# Patient Record
Sex: MALE | Race: BLACK OR AFRICAN AMERICAN | NOT HISPANIC OR LATINO
[De-identification: names, ages, dates, MRNs, and addresses within clinical notes are randomized per-mention and may not be internally consistent; named-entity substitution may affect disease eponyms.]

---

## 2017-02-02 ENCOUNTER — APPOINTMENT (OUTPATIENT)
Dept: HEMATOLOGY ONCOLOGY | Facility: CLINIC | Age: 52
End: 2017-02-02

## 2017-02-02 VITALS
DIASTOLIC BLOOD PRESSURE: 90 MMHG | WEIGHT: 298 LBS | HEIGHT: 71 IN | SYSTOLIC BLOOD PRESSURE: 130 MMHG | HEART RATE: 89 BPM | RESPIRATION RATE: 14 BRPM | TEMPERATURE: 98.4 F | BODY MASS INDEX: 41.72 KG/M2

## 2017-02-03 LAB
BASOPHILS # BLD: 0.04 TH/MM3
BASOPHILS NFR BLD: 0.5 %
EOSINOPHIL # BLD: 0.18 TH/MM3
EOSINOPHIL NFR BLD: 2.2 %
ERYTHROCYTE [DISTWIDTH] IN BLOOD BY AUTOMATED COUNT: 14.8 %
GRANULOCYTES # BLD: 6.43 TH/MM3
GRANULOCYTES NFR BLD: 77.1 %
HCT VFR BLD AUTO: 39.6 %
HGB BLD-MCNC: 14.3 G/DL
IMM GRANULOCYTES # BLD: 0.02 TH/MM3
IMM GRANULOCYTES NFR BLD: 0.2 %
LYMPHOCYTES # BLD: 1.18 TH/MM3
LYMPHOCYTES NFR BLD: 14.2 %
MCH RBC QN AUTO: 30.6 PG
MCHC RBC AUTO-ENTMCNC: 36.1 G/DL
MCV RBC AUTO: 84.8 FL
MONOCYTES # BLD: 0.48 TH/MM3
MONOCYTES NFR BLD: 5.8 %
PLATELET # BLD: 419 TH/MM3
PMV BLD AUTO: 11.1 FL
RBC # BLD AUTO: 4.67 MIL/MM3
WBC # BLD: 8.33 TH/MM3

## 2017-02-22 ENCOUNTER — RX RENEWAL (OUTPATIENT)
Age: 52
End: 2017-02-22

## 2017-02-24 ENCOUNTER — RX RENEWAL (OUTPATIENT)
Age: 52
End: 2017-02-24

## 2017-05-04 ENCOUNTER — APPOINTMENT (OUTPATIENT)
Dept: HEMATOLOGY ONCOLOGY | Facility: CLINIC | Age: 52
End: 2017-05-04

## 2017-05-04 VITALS
RESPIRATION RATE: 14 BRPM | HEART RATE: 76 BPM | DIASTOLIC BLOOD PRESSURE: 89 MMHG | BODY MASS INDEX: 40.04 KG/M2 | SYSTOLIC BLOOD PRESSURE: 131 MMHG | TEMPERATURE: 98 F | HEIGHT: 71 IN | WEIGHT: 286 LBS

## 2017-05-05 LAB
BASOPHILS # BLD: 0.05 TH/MM3
BASOPHILS NFR BLD: 0.6 %
EOSINOPHIL # BLD: 0.24 TH/MM3
EOSINOPHIL NFR BLD: 3.1 %
ERYTHROCYTE [DISTWIDTH] IN BLOOD BY AUTOMATED COUNT: 15.4 %
GRANULOCYTES # BLD: 5.86 TH/MM3
GRANULOCYTES NFR BLD: 75.9 %
HCT VFR BLD AUTO: 41.2 %
HGB BLD-MCNC: 14.5 G/DL
IMM GRANULOCYTES # BLD: 0.02 TH/MM3
IMM GRANULOCYTES NFR BLD: 0.3 %
LYMPHOCYTES # BLD: 1.17 TH/MM3
LYMPHOCYTES NFR BLD: 15.1 %
MCH RBC QN AUTO: 30.5 PG
MCHC RBC AUTO-ENTMCNC: 35.2 G/DL
MCV RBC AUTO: 86.7 FL
MONOCYTES # BLD: 0.39 TH/MM3
MONOCYTES NFR BLD: 5 %
PLATELET # BLD: 407 TH/MM3
PMV BLD AUTO: 11.6 FL
RBC # BLD AUTO: 4.75 MIL/MM3
WBC # BLD: 7.73 TH/MM3

## 2017-06-15 ENCOUNTER — RX RENEWAL (OUTPATIENT)
Age: 52
End: 2017-06-15

## 2017-06-15 RX ORDER — PEGINTERFERON ALFA-2A 180 UG/ML
180 INJECTION, SOLUTION SUBCUTANEOUS
Qty: 4 | Refills: 2 | Status: ACTIVE | COMMUNITY
Start: 2017-02-24 | End: 1900-01-01

## 2017-08-10 ENCOUNTER — OUTPATIENT (OUTPATIENT)
Dept: OUTPATIENT SERVICES | Facility: HOSPITAL | Age: 52
LOS: 1 days | Discharge: HOME | End: 2017-08-10

## 2017-08-10 ENCOUNTER — APPOINTMENT (OUTPATIENT)
Dept: HEMATOLOGY ONCOLOGY | Facility: CLINIC | Age: 52
End: 2017-08-10

## 2017-08-10 VITALS
RESPIRATION RATE: 14 BRPM | DIASTOLIC BLOOD PRESSURE: 95 MMHG | TEMPERATURE: 97.2 F | BODY MASS INDEX: 39.2 KG/M2 | HEIGHT: 71 IN | SYSTOLIC BLOOD PRESSURE: 148 MMHG | WEIGHT: 280 LBS | HEART RATE: 70 BPM

## 2017-08-10 DIAGNOSIS — I10 ESSENTIAL (PRIMARY) HYPERTENSION: ICD-10-CM

## 2017-08-10 DIAGNOSIS — D47.3 ESSENTIAL (HEMORRHAGIC) THROMBOCYTHEMIA: ICD-10-CM

## 2017-08-11 LAB
ALBUMIN SERPL-MCNC: 4.5 G/DL
ALBUMIN/GLOB SERPL: 1.67
ALP SERPL-CCNC: 53 IU/L
ALT SERPL-CCNC: 64 IU/L
ANION GAP SERPL CALC-SCNC: 9 MEQ/L
AST SERPL-CCNC: 44 IU/L
BASOPHILS # BLD: 0.04 TH/MM3
BASOPHILS NFR BLD: 0.5 %
BILIRUB SERPL-MCNC: 1.1 MG/DL
BUN SERPL-MCNC: 12 MG/DL
BUN/CREAT SERPL: 11.8 %
CALCIUM SERPL-MCNC: 9.7 MG/DL
CHLORIDE SERPL-SCNC: 98 MEQ/L
CO2 SERPL-SCNC: 29 MEQ/L
CREAT SERPL-MCNC: 1.02 MG/DL
EOSINOPHIL # BLD: 0.12 TH/MM3
EOSINOPHIL NFR BLD: 1.6 %
ERYTHROCYTE [DISTWIDTH] IN BLOOD BY AUTOMATED COUNT: 14.8 %
GFR SERPL CREATININE-BSD FRML MDRD: 93
GLUCOSE SERPL-MCNC: 117 MG/DL
GRANULOCYTES # BLD: 5.8 TH/MM3
GRANULOCYTES NFR BLD: 79.4 %
HCT VFR BLD AUTO: 40.2 %
HGB BLD-MCNC: 14.1 G/DL
IMM GRANULOCYTES # BLD: 0.02 TH/MM3
IMM GRANULOCYTES NFR BLD: 0.3 %
LYMPHOCYTES # BLD: 0.9 TH/MM3
LYMPHOCYTES NFR BLD: 12.3 %
MCH RBC QN AUTO: 30.8 PG
MCHC RBC AUTO-ENTMCNC: 35.1 G/DL
MCV RBC AUTO: 87.8 FL
MONOCYTES # BLD: 0.43 TH/MM3
MONOCYTES NFR BLD: 5.9 %
PLATELET # BLD: 395 TH/MM3
PMV BLD AUTO: 10.6 FL
POTASSIUM SERPL-SCNC: 4.3 MMOL/L
PROT SERPL-MCNC: 7.2 G/DL
RBC # BLD AUTO: 4.58 MIL/MM3
SODIUM SERPL-SCNC: 136 MEQ/L
WBC # BLD: 7.31 TH/MM3

## 2017-08-14 LAB — TSH SERPL DL<=0.005 MIU/L-ACNC: 2.78 UIU/ML

## 2017-09-12 ENCOUNTER — OTHER (OUTPATIENT)
Age: 52
End: 2017-09-12

## 2017-09-12 RX ORDER — ANAGRELIDE 0.5 MG/1
0.5 CAPSULE ORAL TWICE DAILY
Qty: 60 | Refills: 1 | Status: ACTIVE | COMMUNITY
Start: 2017-09-12 | End: 1900-01-01

## 2017-09-14 ENCOUNTER — RX RENEWAL (OUTPATIENT)
Age: 52
End: 2017-09-14

## 2017-09-30 ENCOUNTER — OUTPATIENT (OUTPATIENT)
Dept: OUTPATIENT SERVICES | Facility: HOSPITAL | Age: 52
LOS: 1 days | Discharge: HOME | End: 2017-09-30

## 2017-09-30 DIAGNOSIS — E11.9 TYPE 2 DIABETES MELLITUS WITHOUT COMPLICATIONS: ICD-10-CM

## 2017-09-30 DIAGNOSIS — E78.5 HYPERLIPIDEMIA, UNSPECIFIED: ICD-10-CM

## 2017-11-06 ENCOUNTER — APPOINTMENT (OUTPATIENT)
Dept: HEMATOLOGY ONCOLOGY | Facility: CLINIC | Age: 52
End: 2017-11-06

## 2018-03-15 ENCOUNTER — RX RENEWAL (OUTPATIENT)
Age: 53
End: 2018-03-15

## 2018-03-15 RX ORDER — ANAGRELIDE 0.5 MG/1
0.5 CAPSULE ORAL TWICE DAILY
Qty: 30 | Refills: 0 | Status: ACTIVE | COMMUNITY
Start: 2017-08-11 | End: 1900-01-01

## 2018-03-19 ENCOUNTER — LABORATORY RESULT (OUTPATIENT)
Age: 53
End: 2018-03-19

## 2018-03-19 ENCOUNTER — APPOINTMENT (OUTPATIENT)
Dept: HEMATOLOGY ONCOLOGY | Facility: CLINIC | Age: 53
End: 2018-03-19

## 2018-03-19 VITALS
TEMPERATURE: 97.1 F | BODY MASS INDEX: 38.64 KG/M2 | SYSTOLIC BLOOD PRESSURE: 163 MMHG | DIASTOLIC BLOOD PRESSURE: 89 MMHG | HEART RATE: 86 BPM | WEIGHT: 276 LBS | HEIGHT: 71 IN | RESPIRATION RATE: 14 BRPM

## 2018-03-19 LAB
HCT VFR BLD CALC: 43.9 %
HGB BLD-MCNC: 15.4 G/DL
MCHC RBC-ENTMCNC: 30.4 PG
MCHC RBC-ENTMCNC: 35.1 G/DL
MCV RBC AUTO: 86.6 FL
PLATELET # BLD AUTO: 486 K/UL
PMV BLD: 11.4 FL
RBC # BLD: 5.07 M/UL
RBC # FLD: 13.8 %
WBC # FLD AUTO: 10.7 K/UL

## 2018-03-19 RX ORDER — HYDROCHLOROTHIAZIDE 50 MG/1
50 TABLET ORAL
Refills: 0 | Status: ACTIVE | COMMUNITY

## 2018-03-19 RX ORDER — METOPROLOL TARTRATE 25 MG/1
25 TABLET, FILM COATED ORAL
Refills: 0 | Status: ACTIVE | COMMUNITY

## 2018-03-19 RX ORDER — NIFEDIPINE 10 MG/1
10 CAPSULE ORAL
Refills: 0 | Status: ACTIVE | COMMUNITY

## 2018-03-19 RX ORDER — CLOPIDOGREL BISULFATE 300 MG/1
TABLET, FILM COATED ORAL
Refills: 0 | Status: ACTIVE | COMMUNITY

## 2018-03-19 RX ORDER — METFORMIN HYDROCHLORIDE 500 MG/1
500 TABLET, COATED ORAL
Refills: 0 | Status: ACTIVE | COMMUNITY

## 2018-03-19 RX ORDER — PEGINTERFERON ALFA-2A 180 UG/.5ML
180 INJECTION, SOLUTION SUBCUTANEOUS
Refills: 0 | Status: DISCONTINUED | COMMUNITY

## 2018-03-19 RX ORDER — FAMOTIDINE 20 MG/1
20 TABLET, FILM COATED ORAL
Refills: 0 | Status: DISCONTINUED | COMMUNITY

## 2018-03-19 RX ORDER — QUINAPRIL HYDROCHLORIDE 10 MG/1
10 TABLET, FILM COATED ORAL
Refills: 0 | Status: ACTIVE | COMMUNITY

## 2018-03-20 LAB
ALBUMIN SERPL ELPH-MCNC: 4.9 G/DL
ALP BLD-CCNC: 71 U/L
ALT SERPL-CCNC: 45 U/L
ANION GAP SERPL CALC-SCNC: 18 MMOL/L
AST SERPL-CCNC: 24 U/L
BILIRUB SERPL-MCNC: 1 MG/DL
BUN SERPL-MCNC: 17 MG/DL
CALCIUM SERPL-MCNC: 10.1 MG/DL
CHLORIDE SERPL-SCNC: 92 MMOL/L
CO2 SERPL-SCNC: 27 MMOL/L
CREAT SERPL-MCNC: 1.3 MG/DL
GLUCOSE SERPL-MCNC: 372 MG/DL
POTASSIUM SERPL-SCNC: 4.3 MMOL/L
PROT SERPL-MCNC: 7.9 G/DL
SODIUM SERPL-SCNC: 137 MMOL/L

## 2018-05-21 ENCOUNTER — APPOINTMENT (OUTPATIENT)
Dept: HEMATOLOGY ONCOLOGY | Facility: CLINIC | Age: 53
End: 2018-05-21

## 2018-05-29 ENCOUNTER — OTHER (OUTPATIENT)
Age: 53
End: 2018-05-29

## 2018-05-29 ENCOUNTER — APPOINTMENT (OUTPATIENT)
Dept: HEMATOLOGY ONCOLOGY | Facility: CLINIC | Age: 53
End: 2018-05-29

## 2018-05-29 ENCOUNTER — LABORATORY RESULT (OUTPATIENT)
Age: 53
End: 2018-05-29

## 2018-05-29 ENCOUNTER — RX RENEWAL (OUTPATIENT)
Age: 53
End: 2018-05-29

## 2018-05-29 ENCOUNTER — OUTPATIENT (OUTPATIENT)
Dept: OUTPATIENT SERVICES | Facility: HOSPITAL | Age: 53
LOS: 1 days | Discharge: HOME | End: 2018-05-29

## 2018-05-29 DIAGNOSIS — D47.3 ESSENTIAL (HEMORRHAGIC) THROMBOCYTHEMIA: ICD-10-CM

## 2018-05-29 LAB
HCT VFR BLD CALC: 41.6 %
HGB BLD-MCNC: 15.1 G/DL
MCHC RBC-ENTMCNC: 30.6 PG
MCHC RBC-ENTMCNC: 36.3 G/DL
MCV RBC AUTO: 84.4 FL
PLATELET # BLD AUTO: 546 K/UL
PMV BLD: 12.2 FL
RBC # BLD: 4.93 M/UL
RBC # FLD: 13.4 %
WBC # FLD AUTO: 11.75 K/UL

## 2018-08-23 ENCOUNTER — RX RENEWAL (OUTPATIENT)
Age: 53
End: 2018-08-23

## 2018-10-11 ENCOUNTER — RX RENEWAL (OUTPATIENT)
Age: 53
End: 2018-10-11

## 2018-12-24 ENCOUNTER — APPOINTMENT (OUTPATIENT)
Dept: HEMATOLOGY ONCOLOGY | Facility: CLINIC | Age: 53
End: 2018-12-24

## 2018-12-24 ENCOUNTER — OUTPATIENT (OUTPATIENT)
Dept: OUTPATIENT SERVICES | Facility: HOSPITAL | Age: 53
LOS: 1 days | Discharge: HOME | End: 2018-12-24

## 2018-12-24 ENCOUNTER — LABORATORY RESULT (OUTPATIENT)
Age: 53
End: 2018-12-24

## 2018-12-24 VITALS
HEIGHT: 71 IN | BODY MASS INDEX: 40.18 KG/M2 | DIASTOLIC BLOOD PRESSURE: 81 MMHG | RESPIRATION RATE: 14 BRPM | WEIGHT: 287 LBS | SYSTOLIC BLOOD PRESSURE: 124 MMHG | TEMPERATURE: 96.5 F | HEART RATE: 95 BPM

## 2018-12-24 LAB
HCT VFR BLD CALC: 37.9 %
HGB BLD-MCNC: 13.3 G/DL
MCHC RBC-ENTMCNC: 29.8 PG
MCHC RBC-ENTMCNC: 35.1 G/DL
MCV RBC AUTO: 85 FL
PLATELET # BLD AUTO: 344 K/UL
PMV BLD: 12.2 FL
RBC # BLD: 4.46 M/UL
RBC # FLD: 16.5 %
WBC # FLD AUTO: 9.16 K/UL

## 2018-12-24 NOTE — PHYSICAL EXAM
[Obese] : obese [Fully active, able to carry on all pre-disease performance without restriction] : Status 0 - Fully active, able to carry on all pre-disease performance without restriction [Normal] : full range of motion and no deformities appreciated

## 2018-12-26 DIAGNOSIS — D47.3 ESSENTIAL (HEMORRHAGIC) THROMBOCYTHEMIA: ICD-10-CM

## 2018-12-26 LAB
ALBUMIN SERPL ELPH-MCNC: 4.9 G/DL
ALP BLD-CCNC: 58 U/L
ALT SERPL-CCNC: 39 U/L
ANION GAP SERPL CALC-SCNC: 21 MMOL/L
AST SERPL-CCNC: 29 U/L
BILIRUB SERPL-MCNC: 0.9 MG/DL
BUN SERPL-MCNC: 15 MG/DL
CALCIUM SERPL-MCNC: 9.7 MG/DL
CHLORIDE SERPL-SCNC: 92 MMOL/L
CO2 SERPL-SCNC: 23 MMOL/L
CREAT SERPL-MCNC: 1.3 MG/DL
GLUCOSE SERPL-MCNC: 290 MG/DL
POTASSIUM SERPL-SCNC: 3.5 MMOL/L
PROT SERPL-MCNC: 7.5 G/DL
SODIUM SERPL-SCNC: 136 MMOL/L
TSH SERPL-ACNC: 1.4 UIU/ML

## 2018-12-26 NOTE — HISTORY OF PRESENT ILLNESS
[de-identified] : 52 year old male with TAMEKA 2 positive essential thrombocythemia on pegasys 180 mcg weekly , tolerated well . PMH of hypertension and CAD on plavix and ASA .  [de-identified] : He returns for follow-up , no new complaints . \par He is tolerating Anagrelide with no new symptoms. takes 0.5 mg twice daily.\par No Rash, chest pain, abdominal pain or SOB, Palpitations.\par Review of system is negative.\par \par 12/24/18\par Patient here for follow up\par So for 2 months, has been having lose watery stool, associated with high fiber foods at times, other times there is no association \par Occasional palpitations\par No headache, blurry vision, chest pain, shortness of breath, dizziness

## 2018-12-26 NOTE — ASSESSMENT
[FreeTextEntry1] : 54yo M with TAMEKA 2 Driven Essential thrombocythemia with history of MI: was previously  well controlled on interferon but had to stop due to insurance issues, has been on anagrelide \par \par PLAN:\par JAK2 positive ET:\par - Current platelet count 344, with normal WBC, and mild decrease in Hgb to 13.3\par - Continue with Anagrelide 1mg BID, refill requested for [facility] \par \par \par

## 2019-03-04 ENCOUNTER — APPOINTMENT (OUTPATIENT)
Dept: HEMATOLOGY ONCOLOGY | Facility: CLINIC | Age: 54
End: 2019-03-04

## 2019-03-25 ENCOUNTER — APPOINTMENT (OUTPATIENT)
Dept: HEMATOLOGY ONCOLOGY | Facility: CLINIC | Age: 54
End: 2019-03-25

## 2019-03-25 ENCOUNTER — LABORATORY RESULT (OUTPATIENT)
Age: 54
End: 2019-03-25

## 2019-03-25 ENCOUNTER — OUTPATIENT (OUTPATIENT)
Dept: OUTPATIENT SERVICES | Facility: HOSPITAL | Age: 54
LOS: 1 days | Discharge: HOME | End: 2019-03-25

## 2019-03-25 VITALS
BODY MASS INDEX: 38.5 KG/M2 | HEIGHT: 71 IN | WEIGHT: 275 LBS | DIASTOLIC BLOOD PRESSURE: 84 MMHG | SYSTOLIC BLOOD PRESSURE: 140 MMHG | HEART RATE: 89 BPM | TEMPERATURE: 96.9 F | RESPIRATION RATE: 14 BRPM

## 2019-03-25 LAB
HCT VFR BLD CALC: 41 %
HGB BLD-MCNC: 14.1 G/DL
MCHC RBC-ENTMCNC: 28.8 PG
MCHC RBC-ENTMCNC: 34.4 G/DL
MCV RBC AUTO: 83.7 FL
PLATELET # BLD AUTO: 415 K/UL
PMV BLD: 11.9 FL
RBC # BLD: 4.9 M/UL
RBC # FLD: 16.9 %
WBC # FLD AUTO: 10.48 K/UL

## 2019-03-25 NOTE — PHYSICAL EXAM
[Fully active, able to carry on all pre-disease performance without restriction] : Status 0 - Fully active, able to carry on all pre-disease performance without restriction [Obese] : obese [Normal] : full range of motion and no deformities appreciated

## 2019-03-26 LAB
ALBUMIN SERPL ELPH-MCNC: 4.8 G/DL
ALP BLD-CCNC: 86 U/L
ALT SERPL-CCNC: 55 U/L
ANION GAP SERPL CALC-SCNC: 17 MMOL/L
AST SERPL-CCNC: 32 U/L
BILIRUB SERPL-MCNC: 0.9 MG/DL
BUN SERPL-MCNC: 20 MG/DL
CALCIUM SERPL-MCNC: 10.5 MG/DL
CHLORIDE SERPL-SCNC: 98 MMOL/L
CO2 SERPL-SCNC: 26 MMOL/L
CREAT SERPL-MCNC: 1.3 MG/DL
FERRITIN SERPL-MCNC: 172 NG/ML
GLUCOSE SERPL-MCNC: 187 MG/DL
IRON SATN MFR SERPL: 32 %
IRON SERPL-MCNC: 96 UG/DL
POTASSIUM SERPL-SCNC: 3.5 MMOL/L
PROT SERPL-MCNC: 7.4 G/DL
SODIUM SERPL-SCNC: 141 MMOL/L
TIBC SERPL-MCNC: 302 UG/DL
UIBC SERPL-MCNC: 206 UG/DL

## 2019-03-26 NOTE — ASSESSMENT
[FreeTextEntry1] : 52yo M with TAMEKA 2 Driven Essential thrombocythemia with history of MI: was previously  well controlled on interferon but had to stop due to insurance issues, has been on anagrelide \par \par PLAN:\par JAK2 positive ET:\par - CBC today showed Plts 415 with normal wbc and Hgb. \par - Continue with Anagrelide 1mg BID, refill requested for [facility].\par - Continue ASA 81mg. \par - Labs ordered. \par \par RTC in 4 months.\par Pt seen and examined with \par \par

## 2019-03-28 DIAGNOSIS — D47.3 ESSENTIAL (HEMORRHAGIC) THROMBOCYTHEMIA: ICD-10-CM

## 2019-07-22 ENCOUNTER — OUTPATIENT (OUTPATIENT)
Dept: OUTPATIENT SERVICES | Facility: HOSPITAL | Age: 54
LOS: 1 days | Discharge: HOME | End: 2019-07-22

## 2019-07-22 ENCOUNTER — APPOINTMENT (OUTPATIENT)
Dept: HEMATOLOGY ONCOLOGY | Facility: CLINIC | Age: 54
End: 2019-07-22
Payer: COMMERCIAL

## 2019-07-22 ENCOUNTER — LABORATORY RESULT (OUTPATIENT)
Age: 54
End: 2019-07-22

## 2019-07-22 VITALS
HEIGHT: 71 IN | SYSTOLIC BLOOD PRESSURE: 126 MMHG | HEART RATE: 88 BPM | WEIGHT: 272 LBS | RESPIRATION RATE: 14 BRPM | BODY MASS INDEX: 38.08 KG/M2 | DIASTOLIC BLOOD PRESSURE: 66 MMHG | TEMPERATURE: 96.5 F

## 2019-07-22 LAB
HCT VFR BLD CALC: 42.4 %
HGB BLD-MCNC: 14.7 G/DL
MCHC RBC-ENTMCNC: 29.1 PG
MCHC RBC-ENTMCNC: 34.7 G/DL
MCV RBC AUTO: 83.8 FL
PLATELET # BLD AUTO: 510 K/UL
PMV BLD: 12.9 FL
RBC # BLD: 5.06 M/UL
RBC # FLD: 19 %
WBC # FLD AUTO: 10.43 K/UL

## 2019-07-22 PROCEDURE — 99213 OFFICE O/P EST LOW 20 MIN: CPT

## 2019-07-23 NOTE — ASSESSMENT
[FreeTextEntry1] : 52yo M with TAMEKA 2 Driven Essential thrombocythemia with history of MI: was previously  well controlled on interferon but had to stop due to insurance issues, has been on anagrelide \par \par PLAN:\par JAK2 positive ET:\par - CBC today showed some rise in plts from 415 to 510. However, he does mention that he missed almost a week of anegrelide as the medication was not delivered. This happened approximately about a week ago.Thus, we will continue with same dose of Anagrelide 1mg BID\par - Continue ASA 81mg. \par \par RTC in 3 months.\par Pt seen and examined with \par \par

## 2019-07-23 NOTE — HISTORY OF PRESENT ILLNESS
[de-identified] : 52 year old male with TAMEKA 2 positive essential thrombocythemia on pegasys 180 mcg weekly , tolerated well . PMH of hypertension and CAD on plavix and ASA .  [de-identified] : He returns for follow-up , no new complaints . \par He is tolerating Anagrelide with no new symptoms. takes 0.5 mg twice daily.\par No Rash, chest pain, abdominal pain or SOB, Palpitations.\par Review of system is negative.\par \par 12/24/18\par Patient here for follow up\par So for 2 months, has been having lose watery stool, associated with high fiber foods at times, other times there is no association \par Occasional palpitations\par No headache, blurry vision, chest pain, shortness of breath, dizziness \par \par 3/25/19:\par Doing well. No major complaints.\par Denies fever, nausea, vomiting, chest pain, SOB, abdominal pain, bowel and bladder problems.\par On Anagrelide around 1 year.\par On ASA 81mg.\par No bleeding. \par \par 7/22/19: Pt returns for a f/u visit. He has no fresh complaints. His CBC today showed some rise in plts from 415 to 510. However, he does mention that he missed almost a week of anegrelide as the medication was not delivered. This happened approximately about a week ago.

## 2019-07-23 NOTE — REASON FOR VISIT
[Follow-Up Visit] : a follow-up visit for [Spouse] : spouse [FreeTextEntry2] : Essential thrombocythemia

## 2019-07-24 DIAGNOSIS — D47.3 ESSENTIAL (HEMORRHAGIC) THROMBOCYTHEMIA: ICD-10-CM

## 2019-11-04 ENCOUNTER — APPOINTMENT (OUTPATIENT)
Dept: HEMATOLOGY ONCOLOGY | Facility: CLINIC | Age: 54
End: 2019-11-04

## 2019-11-27 ENCOUNTER — RX RENEWAL (OUTPATIENT)
Age: 54
End: 2019-11-27

## 2019-11-30 NOTE — HISTORY OF PRESENT ILLNESS
Pt taken to exercise room with 2 staff, phone and ticket to ride. She biked for a mile and used the massage chair. Also stretched on the exercise ball.   [de-identified] : 52 year old male with TAMEKA 2 positive essential thrombocythemia on pegasys 180 mcg weekly , tolerated well . PMH of hypertension and CAD on plavix and ASA .  [de-identified] : He returns for follow-up , no new complaints . \par He is tolerating Anagrelide with no new symptoms. takes 0.5 mg twice daily.\par No Rash, chest pain, abdominal pain or SOB, Palpitations.\par Review of system is negative.\par \par 12/24/18\par Patient here for follow up\par So for 2 months, has been having lose watery stool, associated with high fiber foods at times, other times there is no association \par Occasional palpitations\par No headache, blurry vision, chest pain, shortness of breath, dizziness \par \par 3/25/19:\par Doing well. No major complaints.\par Denies fever, nausea, vomiting, chest pain, SOB, abdominal pain, bowel and bladder problems.\par On Anagrelide around 1 year.\par On ASA 81mg.\par No bleeding. \par

## 2019-12-03 ENCOUNTER — OUTPATIENT (OUTPATIENT)
Dept: OUTPATIENT SERVICES | Facility: HOSPITAL | Age: 54
LOS: 1 days | Discharge: HOME | End: 2019-12-03

## 2019-12-03 ENCOUNTER — APPOINTMENT (OUTPATIENT)
Dept: HEMATOLOGY ONCOLOGY | Facility: CLINIC | Age: 54
End: 2019-12-03
Payer: COMMERCIAL

## 2019-12-03 ENCOUNTER — LABORATORY RESULT (OUTPATIENT)
Age: 54
End: 2019-12-03

## 2019-12-03 VITALS
SYSTOLIC BLOOD PRESSURE: 129 MMHG | TEMPERATURE: 97.9 F | WEIGHT: 272 LBS | HEIGHT: 71 IN | HEART RATE: 88 BPM | RESPIRATION RATE: 16 BRPM | DIASTOLIC BLOOD PRESSURE: 78 MMHG | BODY MASS INDEX: 38.08 KG/M2

## 2019-12-03 PROCEDURE — 99213 OFFICE O/P EST LOW 20 MIN: CPT

## 2019-12-31 NOTE — HISTORY OF PRESENT ILLNESS
[de-identified] : 52 year old male with TAMEKA 2 positive essential thrombocythemia on pegasys 180 mcg weekly , tolerated well . PMH of hypertension and CAD on plavix and ASA .  [de-identified] : He returns for follow-up , no new complaints . \par He is tolerating Anagrelide with no new symptoms. takes 0.5 mg twice daily.\par No Rash, chest pain, abdominal pain or SOB, Palpitations.\par Review of system is negative.\par \par 12/24/18\par Patient here for follow up\par So for 2 months, has been having lose watery stool, associated with high fiber foods at times, other times there is no association \par Occasional palpitations\par No headache, blurry vision, chest pain, shortness of breath, dizziness \par \par 3/25/19:\par Doing well. No major complaints.\par Denies fever, nausea, vomiting, chest pain, SOB, abdominal pain, bowel and bladder problems.\par On Anagrelide around 1 year.\par On ASA 81mg.\par No bleeding. \par \par 7/22/19: Pt returns for a f/u visit. He has no fresh complaints. His CBC today showed some rise in plts from 415 to 510. However, he does mention that he missed almost a week of anegrelide as the medication was not delivered. This happened approximately about a week ago. \par \par 12/3/19: Pt returns for a f/u visit. No fresh complaints. Plts are 399 with Hb 13.8 and WBC 10.63. Tolerating anegrelide well.

## 2020-01-07 DIAGNOSIS — D47.3 ESSENTIAL (HEMORRHAGIC) THROMBOCYTHEMIA: ICD-10-CM

## 2020-01-22 ENCOUNTER — RX RENEWAL (OUTPATIENT)
Age: 55
End: 2020-01-22

## 2020-03-01 LAB
HCT VFR BLD CALC: 39.9 %
HGB BLD-MCNC: 13.8 G/DL
MCHC RBC-ENTMCNC: 29.4 PG
MCHC RBC-ENTMCNC: 34.6 G/DL
MCV RBC AUTO: 84.9 FL
PLATELET # BLD AUTO: 399 K/UL
PMV BLD: NORMAL
RBC # BLD: 4.7 M/UL
RBC # FLD: 19.4 %
WBC # FLD AUTO: 10.63 K/UL

## 2020-03-02 ENCOUNTER — APPOINTMENT (OUTPATIENT)
Dept: HEMATOLOGY ONCOLOGY | Facility: CLINIC | Age: 55
End: 2020-03-02

## 2020-04-16 ENCOUNTER — RX RENEWAL (OUTPATIENT)
Age: 55
End: 2020-04-16

## 2020-05-13 ENCOUNTER — APPOINTMENT (OUTPATIENT)
Dept: UROLOGY | Facility: CLINIC | Age: 55
End: 2020-05-13

## 2020-07-10 ENCOUNTER — RX RENEWAL (OUTPATIENT)
Age: 55
End: 2020-07-10

## 2020-09-08 ENCOUNTER — LABORATORY RESULT (OUTPATIENT)
Age: 55
End: 2020-09-08

## 2020-09-08 ENCOUNTER — APPOINTMENT (OUTPATIENT)
Dept: HEMATOLOGY ONCOLOGY | Facility: CLINIC | Age: 55
End: 2020-09-08
Payer: COMMERCIAL

## 2020-09-08 VITALS
DIASTOLIC BLOOD PRESSURE: 79 MMHG | BODY MASS INDEX: 37.8 KG/M2 | RESPIRATION RATE: 16 BRPM | WEIGHT: 270 LBS | HEIGHT: 71 IN | SYSTOLIC BLOOD PRESSURE: 130 MMHG | HEART RATE: 88 BPM | TEMPERATURE: 96.8 F

## 2020-09-08 LAB
HCT VFR BLD CALC: 39.2 %
HGB BLD-MCNC: 13.4 G/DL
MCHC RBC-ENTMCNC: 29.2 PG
MCHC RBC-ENTMCNC: 34.2 G/DL
MCV RBC AUTO: 85.4 FL
PLATELET # BLD AUTO: 297 K/UL
PMV BLD: NORMAL
RBC # BLD: 4.59 M/UL
RBC # FLD: 19.6 %
WBC # FLD AUTO: 11.64 K/UL

## 2020-09-08 PROCEDURE — 99214 OFFICE O/P EST MOD 30 MIN: CPT

## 2020-09-09 NOTE — HISTORY OF PRESENT ILLNESS
[de-identified] : He returns for follow-up , no new complaints . \par He is tolerating Anagrelide with no new symptoms. takes 0.5 mg twice daily.\par No Rash, chest pain, abdominal pain or SOB, Palpitations.\par Review of system is negative.\par \par 12/24/18\par Patient here for follow up\par So for 2 months, has been having lose watery stool, associated with high fiber foods at times, other times there is no association \par Occasional palpitations\par No headache, blurry vision, chest pain, shortness of breath, dizziness \par \par 3/25/19:\par Doing well. No major complaints.\par Denies fever, nausea, vomiting, chest pain, SOB, abdominal pain, bowel and bladder problems.\par On Anagrelide around 1 year.\par On ASA 81mg.\par No bleeding. \par \par 7/22/19: Pt returns for a f/u visit. He has no fresh complaints. His CBC today showed some rise in plts from 415 to 510. However, he does mention that he missed almost a week of anegrelide as the medication was not delivered. This happened approximately about a week ago. \par \par 12/3/19: Pt returns for a f/u visit. No fresh complaints. Plts are 399 with Hb 13.8 and WBC 10.63. Tolerating anegrelide well. \par \par 9/8/2020: The patient is here for follow up . He remains on anagrelide for 1,5 yrs. His only complaint is fatigue, no headache, no dizziness, no cardiac or respiratory problems. He sustained a mechanical fall few months back ,and sustained left ribs fracture , and left shoulder trauma [de-identified] : 52 year old male with TAMEKA 2 positive essential thrombocythemia on pegasys 180 mcg weekly , tolerated well . PMH of hypertension and CAD on plavix and ASA .

## 2020-09-09 NOTE — PHYSICAL EXAM
[Fully active, able to carry on all pre-disease performance without restriction] : Status 0 - Fully active, able to carry on all pre-disease performance without restriction [Obese] : obese [Normal] : no peripheral adenopathy appreciated [de-identified] : Left shoulder and left ribs tenderness

## 2020-09-09 NOTE — ASSESSMENT
[FreeTextEntry1] : 54yo M with TAMEKA 2 Driven Essential thrombocythemia with history of MI: was previously  well controlled on interferon but had to stop due to insurance issues, has been on anagrelide 1mg BID for 1.5 yrs\par \par PLAN:\par JAK2 positive ET:\par - CBC is reviewed . Counts are adequate\par - continue with same dose of Anagrelide 1mg BID\par - Continue ASA 81mg and plavix. \par - His fatigue could be secondary to anagrelide and secondary to his disease\par \par RTC in 4 months.\par Pt seen and examined with \par \par

## 2020-11-19 ENCOUNTER — OUTPATIENT (OUTPATIENT)
Dept: OUTPATIENT SERVICES | Facility: HOSPITAL | Age: 55
LOS: 1 days | Discharge: HOME | End: 2020-11-19
Payer: COMMERCIAL

## 2020-11-19 ENCOUNTER — LABORATORY RESULT (OUTPATIENT)
Age: 55
End: 2020-11-19

## 2020-11-19 ENCOUNTER — APPOINTMENT (OUTPATIENT)
Dept: HEMATOLOGY ONCOLOGY | Facility: CLINIC | Age: 55
End: 2020-11-19
Payer: COMMERCIAL

## 2020-11-19 VITALS
TEMPERATURE: 97.1 F | RESPIRATION RATE: 16 BRPM | WEIGHT: 275 LBS | SYSTOLIC BLOOD PRESSURE: 109 MMHG | DIASTOLIC BLOOD PRESSURE: 75 MMHG | HEART RATE: 99 BPM | HEIGHT: 71 IN | BODY MASS INDEX: 38.5 KG/M2

## 2020-11-19 DIAGNOSIS — D47.3 ESSENTIAL (HEMORRHAGIC) THROMBOCYTHEMIA: ICD-10-CM

## 2020-11-19 DIAGNOSIS — Z00.00 ENCOUNTER FOR GENERAL ADULT MEDICAL EXAMINATION W/OUT ABNORMAL FINDINGS: ICD-10-CM

## 2020-11-19 LAB
ALBUMIN SERPL ELPH-MCNC: 5.1 G/DL
ALP BLD-CCNC: 76 U/L
ALT SERPL-CCNC: 34 U/L
ANION GAP SERPL CALC-SCNC: 13 MMOL/L
AST SERPL-CCNC: 27 U/L
BILIRUB SERPL-MCNC: 1.3 MG/DL
BUN SERPL-MCNC: 26 MG/DL
CALCIUM SERPL-MCNC: 10.3 MG/DL
CHLORIDE SERPL-SCNC: 101 MMOL/L
CO2 SERPL-SCNC: 27 MMOL/L
CREAT SERPL-MCNC: 1.6 MG/DL
GLUCOSE SERPL-MCNC: 202 MG/DL
HCT VFR BLD CALC: 39.2 %
HGB BLD-MCNC: 13.6 G/DL
LDH SERPL-CCNC: 602 U/L
MCHC RBC-ENTMCNC: 29.8 PG
MCHC RBC-ENTMCNC: 34.7 G/DL
MCV RBC AUTO: 86 FL
PLATELET # BLD AUTO: 247 K/UL
PMV BLD: NORMAL
POTASSIUM SERPL-SCNC: 4 MMOL/L
PROT SERPL-MCNC: 7.7 G/DL
RBC # BLD: 4.56 M/UL
RBC # FLD: 19.2 %
SODIUM SERPL-SCNC: 141 MMOL/L
WBC # FLD AUTO: 11.86 K/UL

## 2020-11-19 PROCEDURE — 88189 FLOWCYTOMETRY/READ 16 & >: CPT

## 2020-11-19 PROCEDURE — 99214 OFFICE O/P EST MOD 30 MIN: CPT

## 2020-11-19 NOTE — PHYSICAL EXAM
[Obese] : obese [Normal] : normoactive bowel sounds, soft and nontender, no hepatosplenomegaly or masses appreciated

## 2020-11-19 NOTE — HISTORY OF PRESENT ILLNESS
[de-identified] : 52 year old male with TAMEKA 2 positive essential thrombocythemia on pegasys 180 mcg weekly , tolerated well . PMH of hypertension and CAD on plavix and ASA .  [de-identified] : He returns for follow-up , no new complaints . he is scheduled for oral surgery for wisdom tooth . \par \par 11/19/2020 Patient returns for follow up , he continues on anagrelide , recent blood work showed mild leukocytosis with increased immature wbc ( 5 % ? ) , Hb and platelets are stable . He continues on physical therapy for adhesive capsulitis of left shoulder ( currently on medical leave )

## 2020-11-19 NOTE — ASSESSMENT
[FreeTextEntry1] : Essential thrombocythemia , on anagrelide.\par Mild leukocytosis with left shift probably due to underlying MPN , ( no recent systemic or intraarticular steroids ) \par Plan : will review smear , check flow cytometry ,. consider bone marrow .

## 2020-11-24 ENCOUNTER — LABORATORY RESULT (OUTPATIENT)
Age: 55
End: 2020-11-24

## 2020-12-19 ENCOUNTER — RX RENEWAL (OUTPATIENT)
Age: 55
End: 2020-12-19

## 2020-12-19 RX ORDER — ANAGRELIDE 1 MG/1
1 CAPSULE ORAL
Qty: 180 | Refills: 3 | Status: ACTIVE | COMMUNITY
Start: 2017-09-12 | End: 1900-01-01

## 2021-01-11 ENCOUNTER — APPOINTMENT (OUTPATIENT)
Dept: HEMATOLOGY ONCOLOGY | Facility: CLINIC | Age: 56
End: 2021-01-11

## 2021-01-29 ENCOUNTER — LABORATORY RESULT (OUTPATIENT)
Age: 56
End: 2021-01-29

## 2021-01-29 ENCOUNTER — RESULT REVIEW (OUTPATIENT)
Age: 56
End: 2021-01-29

## 2021-01-29 ENCOUNTER — APPOINTMENT (OUTPATIENT)
Dept: HEMATOLOGY ONCOLOGY | Facility: CLINIC | Age: 56
End: 2021-01-29
Payer: COMMERCIAL

## 2021-01-29 VITALS
HEART RATE: 95 BPM | WEIGHT: 268 LBS | SYSTOLIC BLOOD PRESSURE: 133 MMHG | RESPIRATION RATE: 16 BRPM | DIASTOLIC BLOOD PRESSURE: 80 MMHG | HEIGHT: 71 IN | TEMPERATURE: 97.2 F | BODY MASS INDEX: 37.52 KG/M2

## 2021-01-29 PROCEDURE — 88313 SPECIAL STAINS GROUP 2: CPT | Mod: 26

## 2021-01-29 PROCEDURE — 38220 DX BONE MARROW ASPIRATIONS: CPT | Mod: 59

## 2021-01-29 PROCEDURE — 88342 IMHCHEM/IMCYTCHM 1ST ANTB: CPT | Mod: 26,59

## 2021-01-29 PROCEDURE — 88305 TISSUE EXAM BY PATHOLOGIST: CPT | Mod: 26

## 2021-01-29 PROCEDURE — 88311 DECALCIFY TISSUE: CPT | Mod: 26

## 2021-01-29 PROCEDURE — 38221 DX BONE MARROW BIOPSIES: CPT

## 2021-01-29 PROCEDURE — 88189 FLOWCYTOMETRY/READ 16 & >: CPT

## 2021-01-29 PROCEDURE — 88341 IMHCHEM/IMCYTCHM EA ADD ANTB: CPT | Mod: 26

## 2021-01-29 RX ORDER — ACETAMINOPHEN 500 MG
650 TABLET ORAL ONCE
Refills: 0 | Status: COMPLETED | OUTPATIENT
Start: 2021-01-29 | End: 2021-01-29

## 2021-01-29 RX ADMIN — Medication 650 MILLIGRAM(S): at 12:02

## 2021-02-01 ENCOUNTER — LABORATORY RESULT (OUTPATIENT)
Age: 56
End: 2021-02-01

## 2021-02-05 LAB — HEMATOPATHOLOGY REPORT: SIGNIFICANT CHANGE UP

## 2021-02-11 NOTE — PROCEDURE
[Bone Marrow Biopsy] : bone marrow biopsy [Bone Marrow Aspiration] : bone marrow aspiration  [Right lateral decubitus position] : right lateral decubitus position [Aspirate] : aspirate [FISH] : FISH [Cytogenetics] : cytogenetics [Biopsy] : biopsy [Flow Cytometry] : flow cytometry [] : The patient was instructed to remove the bandage the following AM. The patient may bathe. Acetaminophen may be taken for discomfort, as per package directions.If there are any other problems, the patient was instructed to call the office. The patient verbalized understanding, and is aware of the office contact numbers. [FreeTextEntry1] : MPN

## 2021-02-16 ENCOUNTER — LABORATORY RESULT (OUTPATIENT)
Age: 56
End: 2021-02-16

## 2021-02-16 ENCOUNTER — APPOINTMENT (OUTPATIENT)
Dept: HEMATOLOGY ONCOLOGY | Facility: CLINIC | Age: 56
End: 2021-02-16
Payer: COMMERCIAL

## 2021-02-16 VITALS
RESPIRATION RATE: 16 BRPM | SYSTOLIC BLOOD PRESSURE: 127 MMHG | WEIGHT: 268 LBS | HEIGHT: 71 IN | TEMPERATURE: 97.1 F | HEART RATE: 91 BPM | BODY MASS INDEX: 37.52 KG/M2 | DIASTOLIC BLOOD PRESSURE: 80 MMHG

## 2021-02-16 DIAGNOSIS — I25.10 ATHEROSCLEROTIC HEART DISEASE OF NATIVE CORONARY ARTERY W/OUT ANGINA PECTORIS: ICD-10-CM

## 2021-02-16 LAB
HCT VFR BLD CALC: 41.2 %
HGB BLD-MCNC: 14 G/DL
MCHC RBC-ENTMCNC: 29.6 PG
MCHC RBC-ENTMCNC: 34 G/DL
MCV RBC AUTO: 87.1 FL
PLATELET # BLD AUTO: 333 K/UL
PMV BLD: NORMAL
RBC # BLD: 4.73 M/UL
RBC # FLD: 19 %
WBC # FLD AUTO: 12.67 K/UL

## 2021-02-16 PROCEDURE — 99214 OFFICE O/P EST MOD 30 MIN: CPT

## 2021-02-16 NOTE — PHYSICAL EXAM
[Obese] : obese [Normal] : RRR, normal S1S2, no murmurs, rubs, gallops [de-identified] : obese with large ventral hernia , spleen could not be evaluated.

## 2021-02-16 NOTE — HISTORY OF PRESENT ILLNESS
[de-identified] : 52 year old male with TAMEKA 2 positive essential thrombocythemia on pegasys 180 mcg weekly , tolerated well . PMH of hypertension and CAD on plavix and ASA .  [de-identified] : He returns for follow-up , no new complaints . he is scheduled for oral surgery for wisdom tooth . \par \par 11/19/2020 Patient returns for follow up , he continues on anagrelide , recent blood work showed mild leukocytosis with increased immature wbc ( 5 % ? ) , Hb and platelets are stable . He continues on physical therapy for adhesive capsulitis of left shoulder ( currently on medical leave ) \par \par 02/16/2021 Patient returns after bone marrow biopsy which showed moderate fibrosis , no increased blasts by flow or IHC   . He offers no complaints . He denies abdominal pain , fever , sweats , fatigue or weight loss. No prior imaging of liver or spleen .

## 2021-02-16 NOTE — ASSESSMENT
[FreeTextEntry1] : 55 year old black male on interferon followed by anagrelide for suspected ET . \par Bone marrow shows moderate fibrosis , likely primary , less likely post- thrombocythemic . \par will check liver spleen sonogram , LDH , ferritin .\par The natural history , prognosis and treatment of myelofibrosis was discussed at great length , indications for treatment including stem cell transplant ( only curative therapy )\par Plan : consider to change to hydrea after ultrasound .

## 2021-02-17 LAB
ALBUMIN SERPL ELPH-MCNC: 4.9 G/DL
ALP BLD-CCNC: 80 U/L
ALT SERPL-CCNC: 32 U/L
ANION GAP SERPL CALC-SCNC: 14 MMOL/L
AST SERPL-CCNC: 28 U/L
BILIRUB SERPL-MCNC: 1.1 MG/DL
BUN SERPL-MCNC: 21 MG/DL
CALCIUM SERPL-MCNC: 10 MG/DL
CHLORIDE SERPL-SCNC: 101 MMOL/L
CO2 SERPL-SCNC: 25 MMOL/L
CREAT SERPL-MCNC: 1.4 MG/DL
FERRITIN SERPL-MCNC: 112 NG/ML
GLUCOSE SERPL-MCNC: 109 MG/DL
LDH SERPL-CCNC: 693 U/L
POTASSIUM SERPL-SCNC: 3.9 MMOL/L
PROT SERPL-MCNC: 7.9 G/DL
SODIUM SERPL-SCNC: 140 MMOL/L

## 2021-02-20 ENCOUNTER — OUTPATIENT (OUTPATIENT)
Dept: OUTPATIENT SERVICES | Facility: HOSPITAL | Age: 56
LOS: 1 days | Discharge: HOME | End: 2021-02-20
Payer: COMMERCIAL

## 2021-02-20 DIAGNOSIS — D47.3 ESSENTIAL (HEMORRHAGIC) THROMBOCYTHEMIA: ICD-10-CM

## 2021-02-20 PROCEDURE — 76700 US EXAM ABDOM COMPLETE: CPT | Mod: 26

## 2021-02-22 ENCOUNTER — TRANSCRIPTION ENCOUNTER (OUTPATIENT)
Age: 56
End: 2021-02-22

## 2021-02-25 PROBLEM — I25.10 CAD (CORONARY ARTERY DISEASE): Status: ACTIVE | Noted: 2017-08-10

## 2021-04-15 ENCOUNTER — APPOINTMENT (OUTPATIENT)
Dept: HEMATOLOGY ONCOLOGY | Facility: CLINIC | Age: 56
End: 2021-04-15
Payer: COMMERCIAL

## 2021-04-15 ENCOUNTER — LABORATORY RESULT (OUTPATIENT)
Age: 56
End: 2021-04-15

## 2021-04-15 ENCOUNTER — OUTPATIENT (OUTPATIENT)
Dept: OUTPATIENT SERVICES | Facility: HOSPITAL | Age: 56
LOS: 1 days | Discharge: HOME | End: 2021-04-15
Payer: COMMERCIAL

## 2021-04-15 VITALS
HEIGHT: 71 IN | RESPIRATION RATE: 16 BRPM | BODY MASS INDEX: 37.52 KG/M2 | DIASTOLIC BLOOD PRESSURE: 75 MMHG | TEMPERATURE: 98.7 F | SYSTOLIC BLOOD PRESSURE: 116 MMHG | HEART RATE: 89 BPM | WEIGHT: 268 LBS

## 2021-04-15 DIAGNOSIS — D47.3 ESSENTIAL (HEMORRHAGIC) THROMBOCYTHEMIA: ICD-10-CM

## 2021-04-15 DIAGNOSIS — I25.10 ATHEROSCLEROTIC HEART DISEASE OF NATIVE CORONARY ARTERY WITHOUT ANGINA PECTORIS: ICD-10-CM

## 2021-04-15 DIAGNOSIS — D75.81 MYELOFIBROSIS: ICD-10-CM

## 2021-04-15 PROCEDURE — 99214 OFFICE O/P EST MOD 30 MIN: CPT

## 2021-04-15 RX ORDER — HYDROXYUREA 500 MG/1
500 CAPSULE ORAL
Qty: 45 | Refills: 1 | Status: ACTIVE | COMMUNITY
Start: 2021-02-22 | End: 1900-01-01

## 2021-04-15 RX ORDER — ALLOPURINOL 100 MG/1
100 TABLET ORAL
Qty: 90 | Refills: 1 | Status: ACTIVE | COMMUNITY
Start: 2021-02-22 | End: 1900-01-01

## 2021-04-15 NOTE — ASSESSMENT
[FreeTextEntry1] : 55 year old black male with suspected essential thrombocythemia previously on anagrelide. \par Bone marrow shows moderate fibrosis , likely primary , less likely post- thrombocythemic . \par massive splenomegaly , elevated LDH supports the diagnosis of idiopathic myelofibrosis .\par no indication for TAMEKA 2 inhibition ( no constitutional symptoms or symptomatic splenomegaly ) \par Plan : continue hydrea , same dose , cbc in one month

## 2021-04-15 NOTE — PHYSICAL EXAM
[Obese] : obese [Normal] : RRR, normal S1S2, no murmurs, rubs, gallops [de-identified] : obese with large ventral hernia , spleen could not be evaluated.

## 2021-04-15 NOTE — HISTORY OF PRESENT ILLNESS
[de-identified] : \par 52 year old male with PMH of suspected TAMEKA 2 positive essential thrombocythemia on pegasys 180 mcg weekly , tolerated well. PMH of hypertension and CAD on plavix and ASA. \par \par  [de-identified] : He returns for follow-up , no new complaints . he is scheduled for oral surgery for wisdom tooth . \par \par 11/19/2020 Patient returns for follow up , he continues on anagrelide , recent blood work showed mild leukocytosis with increased immature wbc ( 5 % ? ) , Hb and platelets are stable . He continues on physical therapy for adhesive capsulitis of left shoulder ( currently on medical leave ) \par \par 02/16/2021 Patient returns after bone marrow biopsy which showed moderate fibrosis , no increased blasts by flow or IHC   . He offers no complaints . He denies abdominal pain , fever , sweats , fatigue or weight loss. No prior imaging of liver or spleen . \par \par 04/15/2021 Patient returns for follow up , he started on hydrea 1000/500 3 weeks ago with allopurinol 100 mg daily ,. he denies any adverse effects . ultrasound confirmed the presence of massive splenomegaly 21.5 cm .

## 2021-04-16 LAB
ALBUMIN SERPL ELPH-MCNC: 4.8 G/DL
ALP BLD-CCNC: 68 U/L
ALT SERPL-CCNC: 48 U/L
ANION GAP SERPL CALC-SCNC: 16 MMOL/L
AST SERPL-CCNC: 39 U/L
BILIRUB SERPL-MCNC: 1.1 MG/DL
BUN SERPL-MCNC: 23 MG/DL
CALCIUM SERPL-MCNC: 9.8 MG/DL
CHLORIDE SERPL-SCNC: 98 MMOL/L
CO2 SERPL-SCNC: 26 MMOL/L
CREAT SERPL-MCNC: 1.4 MG/DL
GLUCOSE SERPL-MCNC: 144 MG/DL
HCT VFR BLD CALC: 37.9 %
HGB BLD-MCNC: 13 G/DL
MCHC RBC-ENTMCNC: 31.1 PG
MCHC RBC-ENTMCNC: 34.3 G/DL
MCV RBC AUTO: 90.7 FL
PLATELET # BLD AUTO: 430 K/UL
PMV BLD: NORMAL
POTASSIUM SERPL-SCNC: 4.6 MMOL/L
PROT SERPL-MCNC: 7.5 G/DL
RBC # BLD: 4.18 M/UL
RBC # FLD: 22 %
SODIUM SERPL-SCNC: 140 MMOL/L
URATE SERPL-MCNC: 8.3 MG/DL
WBC # FLD AUTO: 9.58 K/UL

## 2021-04-20 DIAGNOSIS — D75.81 MYELOFIBROSIS: ICD-10-CM

## 2021-05-17 ENCOUNTER — LABORATORY RESULT (OUTPATIENT)
Age: 56
End: 2021-05-17

## 2021-05-17 ENCOUNTER — OUTPATIENT (OUTPATIENT)
Dept: OUTPATIENT SERVICES | Facility: HOSPITAL | Age: 56
LOS: 1 days | Discharge: HOME | End: 2021-05-17

## 2021-05-17 ENCOUNTER — APPOINTMENT (OUTPATIENT)
Dept: HEMATOLOGY ONCOLOGY | Facility: CLINIC | Age: 56
End: 2021-05-17

## 2021-05-17 LAB
HCT VFR BLD CALC: 38.3 %
HGB BLD-MCNC: 13.4 G/DL
MCHC RBC-ENTMCNC: 32.8 PG
MCHC RBC-ENTMCNC: 35 G/DL
MCV RBC AUTO: 93.9 FL
PLATELET # BLD AUTO: 397 K/UL
PMV BLD: NORMAL
RBC # BLD: 4.08 M/UL
RBC # FLD: 22.2 %
WBC # FLD AUTO: 10.53 K/UL

## 2021-06-21 DIAGNOSIS — I25.10 ATHEROSCLEROTIC HEART DISEASE OF NATIVE CORONARY ARTERY WITHOUT ANGINA PECTORIS: ICD-10-CM

## 2021-06-21 DIAGNOSIS — I10 ESSENTIAL (PRIMARY) HYPERTENSION: ICD-10-CM

## 2021-06-21 DIAGNOSIS — D75.81 MYELOFIBROSIS: ICD-10-CM

## 2021-06-22 ENCOUNTER — APPOINTMENT (OUTPATIENT)
Dept: HEMATOLOGY ONCOLOGY | Facility: CLINIC | Age: 56
End: 2021-06-22

## 2021-10-06 PROBLEM — I10 ESSENTIAL HYPERTENSION: Status: ACTIVE | Noted: 2017-08-10

## 2022-01-31 ENCOUNTER — APPOINTMENT (OUTPATIENT)
Age: 57
End: 2022-01-31